# Patient Record
Sex: MALE | Race: WHITE | NOT HISPANIC OR LATINO | URBAN - METROPOLITAN AREA
[De-identification: names, ages, dates, MRNs, and addresses within clinical notes are randomized per-mention and may not be internally consistent; named-entity substitution may affect disease eponyms.]

---

## 2017-01-17 ENCOUNTER — APPOINTMENT (RX ONLY)
Dept: URBAN - METROPOLITAN AREA CLINIC 12 | Facility: CLINIC | Age: 57
Setting detail: DERMATOLOGY
End: 2017-01-17

## 2017-01-17 DIAGNOSIS — L70.0 ACNE VULGARIS: ICD-10-CM

## 2017-01-17 DIAGNOSIS — Z79.899 OTHER LONG TERM (CURRENT) DRUG THERAPY: ICD-10-CM

## 2017-01-17 PROCEDURE — 99213 OFFICE O/P EST LOW 20 MIN: CPT

## 2017-01-17 PROCEDURE — ? HIGH RISK MEDICATION MONITORING

## 2017-01-17 PROCEDURE — ? ORDER TESTS

## 2017-01-17 PROCEDURE — ? PRESCRIPTION

## 2017-01-17 PROCEDURE — ? TREATMENT REGIMEN

## 2017-01-17 PROCEDURE — ? COUNSELING

## 2017-01-17 RX ORDER — ISOTRETINOIN 40 MG/1
CAPSULE ORAL
Qty: 60 | Refills: 0 | Status: ERX

## 2017-01-17 ASSESSMENT — LOCATION DETAILED DESCRIPTION DERM
LOCATION DETAILED: RIGHT CENTRAL MALAR CHEEK
LOCATION DETAILED: LEFT CENTRAL MALAR CHEEK
LOCATION DETAILED: LEFT INFERIOR CENTRAL MALAR CHEEK

## 2017-01-17 ASSESSMENT — LOCATION SIMPLE DESCRIPTION DERM
LOCATION SIMPLE: RIGHT CHEEK
LOCATION SIMPLE: LEFT CHEEK
LOCATION SIMPLE: LEFT CHEEK

## 2017-01-17 ASSESSMENT — LOCATION ZONE DERM
LOCATION ZONE: FACE
LOCATION ZONE: FACE

## 2017-01-17 NOTE — PROCEDURE: TREATMENT REGIMEN
Plan: Continue Absorica 40mg bid \\nLab results received-triglycerides high, recommended increasing fish oil to twice the amount\\nContinue using cerave cream to moisturize
Detail Level: Zone

## 2017-02-21 ENCOUNTER — APPOINTMENT (RX ONLY)
Dept: URBAN - METROPOLITAN AREA CLINIC 12 | Facility: CLINIC | Age: 57
Setting detail: DERMATOLOGY
End: 2017-02-21

## 2017-02-21 DIAGNOSIS — L70.0 ACNE VULGARIS: ICD-10-CM

## 2017-02-21 DIAGNOSIS — Z79.899 OTHER LONG TERM (CURRENT) DRUG THERAPY: ICD-10-CM

## 2017-02-21 PROCEDURE — ? TREATMENT REGIMEN

## 2017-02-21 PROCEDURE — ? COUNSELING

## 2017-02-21 PROCEDURE — 99213 OFFICE O/P EST LOW 20 MIN: CPT

## 2017-02-21 PROCEDURE — ? ORDER TESTS

## 2017-02-21 PROCEDURE — ? HIGH RISK MEDICATION MONITORING

## 2017-02-21 PROCEDURE — ? PRESCRIPTION

## 2017-02-21 RX ORDER — GEMFIBROZIL 600 MG/1
TABLET, FILM COATED ORAL
Qty: 60 | Refills: 2 | Status: ERX | COMMUNITY
Start: 2017-02-21

## 2017-02-21 RX ORDER — ISOTRETINOIN 40 MG/1
CAPSULE ORAL
Qty: 60 | Refills: 0 | Status: ERX

## 2017-02-21 RX ADMIN — GEMFIBROZIL: 600 TABLET, FILM COATED ORAL at 16:34

## 2017-02-21 ASSESSMENT — LOCATION ZONE DERM: LOCATION ZONE: FACE

## 2017-02-21 ASSESSMENT — LOCATION SIMPLE DESCRIPTION DERM: LOCATION SIMPLE: LEFT FOREHEAD

## 2017-02-21 ASSESSMENT — LOCATION DETAILED DESCRIPTION DERM: LOCATION DETAILED: LEFT MEDIAL FOREHEAD

## 2017-02-21 NOTE — PROCEDURE: MIPS QUALITY
Quality 110: Preventive Care And Screening: Influenza Immunization: Influenza Immunization not Administered because Patient Refused.
Detail Level: Generalized
Quality 130: Documentation Of Current Medications In The Medical Record: Current Medications Documented

## 2017-03-23 ENCOUNTER — APPOINTMENT (RX ONLY)
Dept: URBAN - METROPOLITAN AREA CLINIC 12 | Facility: CLINIC | Age: 57
Setting detail: DERMATOLOGY
End: 2017-03-23

## 2017-03-23 DIAGNOSIS — Z79.899 OTHER LONG TERM (CURRENT) DRUG THERAPY: ICD-10-CM

## 2017-03-23 DIAGNOSIS — L70.0 ACNE VULGARIS: ICD-10-CM

## 2017-03-23 PROCEDURE — ? HIGH RISK MEDICATION MONITORING

## 2017-03-23 PROCEDURE — ? TREATMENT REGIMEN

## 2017-03-23 PROCEDURE — ? PRESCRIPTION

## 2017-03-23 PROCEDURE — ? COUNSELING

## 2017-03-23 PROCEDURE — ? ORDER TESTS

## 2017-03-23 PROCEDURE — 99213 OFFICE O/P EST LOW 20 MIN: CPT

## 2017-03-23 RX ORDER — ISOTRETINOIN 40 MG/1
CAPSULE ORAL
Qty: 60 | Refills: 0 | Status: ERX

## 2017-03-23 ASSESSMENT — LOCATION ZONE DERM: LOCATION ZONE: FACE

## 2017-03-23 ASSESSMENT — LOCATION SIMPLE DESCRIPTION DERM: LOCATION SIMPLE: LEFT FOREHEAD

## 2017-03-23 ASSESSMENT — LOCATION DETAILED DESCRIPTION DERM: LOCATION DETAILED: LEFT MEDIAL FOREHEAD

## 2017-03-23 NOTE — PROCEDURE: TREATMENT REGIMEN
Detail Level: Generalized
Continue Regimen: Absorica 40mg bid for month #4\\nFish oil, Cerave cream\\nLabs reviewed

## 2017-03-23 NOTE — PROCEDURE: MIPS QUALITY
Quality 130: Documentation Of Current Medications In The Medical Record: Current Medications Documented
Quality 110: Preventive Care And Screening: Influenza Immunization: Influenza Immunization not Administered because Patient Refused.
Detail Level: Generalized

## 2017-04-28 ENCOUNTER — APPOINTMENT (RX ONLY)
Dept: URBAN - METROPOLITAN AREA CLINIC 12 | Facility: CLINIC | Age: 57
Setting detail: DERMATOLOGY
End: 2017-04-28

## 2017-04-28 DIAGNOSIS — Z79.899 OTHER LONG TERM (CURRENT) DRUG THERAPY: ICD-10-CM

## 2017-04-28 DIAGNOSIS — L70.0 ACNE VULGARIS: ICD-10-CM

## 2017-04-28 PROCEDURE — ? COUNSELING

## 2017-04-28 PROCEDURE — ? PRESCRIPTION

## 2017-04-28 PROCEDURE — ? TREATMENT REGIMEN

## 2017-04-28 PROCEDURE — 99213 OFFICE O/P EST LOW 20 MIN: CPT

## 2017-04-28 PROCEDURE — ? HIGH RISK MEDICATION MONITORING

## 2017-04-28 PROCEDURE — ? ORDER TESTS

## 2017-04-28 RX ORDER — ISOTRETINOIN 30 MG/1
CAPSULE ORAL
Qty: 60 | Refills: 0 | Status: ERX | COMMUNITY
Start: 2017-04-28

## 2017-04-28 RX ORDER — ISOTRETINOIN 40 MG/1
CAPSULE ORAL
Qty: 30 | Refills: 0 | Status: ERX

## 2017-04-28 RX ADMIN — ISOTRETINOIN: 30 CAPSULE ORAL at 20:14

## 2017-04-28 ASSESSMENT — LOCATION DETAILED DESCRIPTION DERM: LOCATION DETAILED: LEFT MEDIAL FOREHEAD

## 2017-04-28 ASSESSMENT — LOCATION ZONE DERM: LOCATION ZONE: FACE

## 2017-04-28 ASSESSMENT — LOCATION SIMPLE DESCRIPTION DERM: LOCATION SIMPLE: LEFT FOREHEAD

## 2017-04-28 NOTE — HPI: MEDICATION (ISOTRETINOIN)
How Severe Is It?: mild
Is This A New Presentation, Or A Follow-Up?: Follow Up Isotretinoin
Additional History: Rash on inner thighs and torso X1 week. Patient had to go the an urgent care because his right hand was swollen, nothing seen on X-rays.

## 2017-04-28 NOTE — PROCEDURE: TREATMENT REGIMEN
Detail Level: Generalized
Plan: Will increase from Absorica 40mg bid to Absorica 40 mg take 1 po qam and absorica 30mg take 2 tabs po qhs for month(total of 100mg qd) #5-will monitor triglycerides and discussed increasing to absorica 120mg if triglycerides stay below 500\\nFish oil, Cerave cream\\nLabs reviewed\\nDiscussed using a steroid cream on torso and inner thighs-patient declines-patient can call for prescription if it bothers him\\nDiscussed trying an extra month-patient is okay with this plan

## 2017-05-26 ENCOUNTER — APPOINTMENT (RX ONLY)
Dept: URBAN - METROPOLITAN AREA CLINIC 12 | Facility: CLINIC | Age: 57
Setting detail: DERMATOLOGY
End: 2017-05-26

## 2017-05-26 DIAGNOSIS — L70.0 ACNE VULGARIS: ICD-10-CM

## 2017-05-26 DIAGNOSIS — Z79.899 OTHER LONG TERM (CURRENT) DRUG THERAPY: ICD-10-CM

## 2017-05-26 PROCEDURE — ? TREATMENT REGIMEN

## 2017-05-26 PROCEDURE — ? HIGH RISK MEDICATION MONITORING

## 2017-05-26 PROCEDURE — ? COUNSELING

## 2017-05-26 PROCEDURE — 99213 OFFICE O/P EST LOW 20 MIN: CPT

## 2017-05-26 PROCEDURE — ? PRESCRIPTION

## 2017-05-26 RX ORDER — ISOTRETINOIN 40 MG/1
CAPSULE ORAL
Qty: 90 | Refills: 0 | Status: ERX

## 2017-05-26 ASSESSMENT — LOCATION DETAILED DESCRIPTION DERM: LOCATION DETAILED: LEFT MEDIAL FOREHEAD

## 2017-05-26 ASSESSMENT — LOCATION ZONE DERM: LOCATION ZONE: FACE

## 2017-05-26 ASSESSMENT — LOCATION SIMPLE DESCRIPTION DERM: LOCATION SIMPLE: LEFT FOREHEAD

## 2017-05-26 NOTE — PROCEDURE: TREATMENT REGIMEN
Continue Regimen: Will increase to Absorica 40 mg take 1 po qam and absorica 40mg take 2 tabs po qhs for month #6 (total of 120mg qd) \\nFish oil, Cerave cream\\nLabs reviewed
Detail Level: Generalized

## 2019-04-26 ENCOUNTER — APPOINTMENT (RX ONLY)
Dept: URBAN - METROPOLITAN AREA CLINIC 48 | Facility: CLINIC | Age: 59
Setting detail: DERMATOLOGY
End: 2019-04-26

## 2019-04-26 DIAGNOSIS — L81.4 OTHER MELANIN HYPERPIGMENTATION: ICD-10-CM

## 2019-04-26 DIAGNOSIS — D18.0 HEMANGIOMA: ICD-10-CM

## 2019-04-26 DIAGNOSIS — L82.1 OTHER SEBORRHEIC KERATOSIS: ICD-10-CM

## 2019-04-26 PROBLEM — D18.01 HEMANGIOMA OF SKIN AND SUBCUTANEOUS TISSUE: Status: ACTIVE | Noted: 2019-04-26

## 2019-04-26 PROCEDURE — 99213 OFFICE O/P EST LOW 20 MIN: CPT

## 2019-04-26 PROCEDURE — ? COUNSELING

## 2019-04-26 ASSESSMENT — LOCATION SIMPLE DESCRIPTION DERM
LOCATION SIMPLE: RIGHT UPPER BACK
LOCATION SIMPLE: POSTERIOR SCALP
LOCATION SIMPLE: LEFT FOREARM
LOCATION SIMPLE: CHEST
LOCATION SIMPLE: RIGHT FOREARM

## 2019-04-26 ASSESSMENT — LOCATION DETAILED DESCRIPTION DERM
LOCATION DETAILED: RIGHT MEDIAL UPPER BACK
LOCATION DETAILED: RIGHT PROXIMAL DORSAL FOREARM
LOCATION DETAILED: LEFT PROXIMAL DORSAL FOREARM
LOCATION DETAILED: LEFT INFERIOR OCCIPITAL SCALP
LOCATION DETAILED: LEFT MEDIAL SUPERIOR CHEST

## 2019-04-26 ASSESSMENT — LOCATION ZONE DERM
LOCATION ZONE: ARM
LOCATION ZONE: SCALP
LOCATION ZONE: TRUNK

## 2019-04-26 ASSESSMENT — PAIN INTENSITY VAS: HOW INTENSE IS YOUR PAIN 0 BEING NO PAIN, 10 BEING THE MOST SEVERE PAIN POSSIBLE?: NO PAIN
